# Patient Record
Sex: MALE | Race: WHITE | NOT HISPANIC OR LATINO | Employment: FULL TIME | RURAL
[De-identification: names, ages, dates, MRNs, and addresses within clinical notes are randomized per-mention and may not be internally consistent; named-entity substitution may affect disease eponyms.]

---

## 2024-06-26 ENCOUNTER — PATIENT MESSAGE (OUTPATIENT)
Dept: FAMILY MEDICINE | Facility: CLINIC | Age: 44
End: 2024-06-26
Payer: COMMERCIAL

## 2024-06-26 ENCOUNTER — OFFICE VISIT (OUTPATIENT)
Dept: FAMILY MEDICINE | Facility: CLINIC | Age: 44
End: 2024-06-26
Payer: COMMERCIAL

## 2024-06-26 VITALS
BODY MASS INDEX: 42.95 KG/M2 | RESPIRATION RATE: 20 BRPM | TEMPERATURE: 98 F | DIASTOLIC BLOOD PRESSURE: 78 MMHG | HEART RATE: 72 BPM | SYSTOLIC BLOOD PRESSURE: 129 MMHG | WEIGHT: 290 LBS | HEIGHT: 69 IN | OXYGEN SATURATION: 98 %

## 2024-06-26 DIAGNOSIS — R09.81 NASAL CONGESTION WITH RHINORRHEA: ICD-10-CM

## 2024-06-26 DIAGNOSIS — J32.1 FRONTAL SINUSITIS, UNSPECIFIED CHRONICITY: Primary | ICD-10-CM

## 2024-06-26 DIAGNOSIS — J34.89 NASAL CONGESTION WITH RHINORRHEA: ICD-10-CM

## 2024-06-26 PROCEDURE — 3008F BODY MASS INDEX DOCD: CPT | Mod: ,,,

## 2024-06-26 PROCEDURE — 99213 OFFICE O/P EST LOW 20 MIN: CPT | Mod: 25,,,

## 2024-06-26 PROCEDURE — 1160F RVW MEDS BY RX/DR IN RCRD: CPT | Mod: ,,,

## 2024-06-26 PROCEDURE — 3078F DIAST BP <80 MM HG: CPT | Mod: ,,,

## 2024-06-26 PROCEDURE — 3074F SYST BP LT 130 MM HG: CPT | Mod: ,,,

## 2024-06-26 PROCEDURE — 96372 THER/PROPH/DIAG INJ SC/IM: CPT | Mod: ,,,

## 2024-06-26 PROCEDURE — 1159F MED LIST DOCD IN RCRD: CPT | Mod: ,,,

## 2024-06-26 RX ORDER — AMOXICILLIN AND CLAVULANATE POTASSIUM 875; 125 MG/1; MG/1
1 TABLET, FILM COATED ORAL EVERY 12 HOURS
Qty: 14 TABLET | Refills: 0 | Status: SHIPPED | OUTPATIENT
Start: 2024-06-26 | End: 2024-06-27

## 2024-06-26 RX ORDER — KETOCONAZOLE 20 MG/G
1 CREAM TOPICAL DAILY
COMMUNITY
Start: 2023-07-24

## 2024-06-26 RX ORDER — DEXAMETHASONE SODIUM PHOSPHATE 4 MG/ML
4 INJECTION, SOLUTION INTRA-ARTICULAR; INTRALESIONAL; INTRAMUSCULAR; INTRAVENOUS; SOFT TISSUE
Status: COMPLETED | OUTPATIENT
Start: 2024-06-26 | End: 2024-06-26

## 2024-06-26 RX ORDER — CEFTRIAXONE 1 G/1
1 INJECTION, POWDER, FOR SOLUTION INTRAMUSCULAR; INTRAVENOUS
Status: COMPLETED | OUTPATIENT
Start: 2024-06-26 | End: 2024-06-26

## 2024-06-26 RX ORDER — FLUTICASONE PROPIONATE 50 MCG
1 SPRAY, SUSPENSION (ML) NASAL DAILY
COMMUNITY

## 2024-06-26 RX ORDER — CLOTRIMAZOLE AND BETAMETHASONE DIPROPIONATE 10; .64 MG/G; MG/G
1 CREAM TOPICAL 2 TIMES DAILY
COMMUNITY
Start: 2023-08-08 | End: 2024-08-07

## 2024-06-26 RX ADMIN — CEFTRIAXONE 1 G: 1 INJECTION, POWDER, FOR SOLUTION INTRAMUSCULAR; INTRAVENOUS at 02:06

## 2024-06-26 RX ADMIN — DEXAMETHASONE SODIUM PHOSPHATE 4 MG: 4 INJECTION, SOLUTION INTRA-ARTICULAR; INTRALESIONAL; INTRAMUSCULAR; INTRAVENOUS; SOFT TISSUE at 02:06

## 2024-06-26 NOTE — PATIENT INSTRUCTIONS
Medications sent to pharmacy  Continue flonase  Return to clinic if symptoms worsen and as needed.

## 2024-06-26 NOTE — PROGRESS NOTES
Cee Russell NP   [address] [place] [address]     PATIENT NAME: Paul Chen  : 1980  DATE: 24  MRN: [id number]      Billing Provider: Cee Russell NP  Level of Service:   Patient PCP Information       Provider PCP Type    Primary Doctor No General            Reason for Visit / Chief Complaint: Fatigue, Nasal Congestion, Ear Fullness, and Eye Burn (Allergy, taking flonase every morning.)       Update PCP  Update Chief Complaint         History of Present Illness / Problem Focused Workflow     Paul Chen presents to the clinic with Fatigue, Nasal Congestion, Ear Fullness, and Eye Burn (Allergy, taking flonase every morning.)     Patient here here today for complaints of ear fullness, nasal congestion, fatigue, sinus drainage, sore throat,and burning eyes. Symptoms have been present for the past week. Denies fever, chills, loss of taste or smell. Afebrile today. Medications sent to pharmacy. Return to clinic if symptoms worsen and as needed.     Fatigue  Associated symptoms include congestion, fatigue and a sore throat. Pertinent negatives include no abdominal pain, chest pain, coughing, numbness or rash.   Ear Fullness   Associated symptoms include rhinorrhea and a sore throat. Pertinent negatives include no abdominal pain, coughing, ear discharge or rash.       Review of Systems     Review of Systems   Constitutional:  Positive for fatigue.   HENT:  Positive for nasal congestion, ear pain, postnasal drip, rhinorrhea, sinus pressure/congestion and sore throat. Negative for ear discharge.    Eyes: Negative.  Negative for discharge, redness and itching.   Respiratory: Negative.  Negative for cough, shortness of breath and wheezing.    Cardiovascular: Negative.  Negative for chest pain and palpitations.   Gastrointestinal: Negative.  Negative for abdominal pain.   Genitourinary: Negative.  Negative for frequency and hematuria.   Integumentary:  Negative for  "rash. Negative.   Neurological:  Negative for dizziness and numbness.   Hematological: Negative.    Psychiatric/Behavioral: Negative.          Medical / Social / Family History   History reviewed. No pertinent past medical history.    Past Surgical History:   Procedure Laterality Date    gasllbladder removal      HERNIA REPAIR      left and right achilles tendon repair      ROTATOR CUFF REPAIR Right     ROTATOR CUFF REPAIR         Social History    reports that he has never smoked. He has never been exposed to tobacco smoke. He has never used smokeless tobacco. He reports current alcohol use. He reports that he does not use drugs.    Family History  's family history includes Cancer in his mother; Heart attack in his father; Lymphoma in his father.    Medications and Allergies     Medications  Outpatient Medications Marked as Taking for the 6/26/24 encounter (Office Visit) with Cee Russell NP   Medication Sig Dispense Refill    clotrimazole-betamethasone 1-0.05% (LOTRISONE) cream Apply 1 g topically 2 (two) times daily.      fluticasone propionate (FLONASE) 50 mcg/actuation nasal spray 1 spray by Each Nostril route once daily.      ketoconazole (NIZORAL) 2 % cream Apply 1 g topically once daily.         Allergies  Review of patient's allergies indicates:   Allergen Reactions    Doxycycline     Penicillin v potassium Other (See Comments)       Physical Examination   /78 (BP Location: Left arm, Patient Position: Sitting, BP Method: Medium (Automatic))   Pulse 72   Temp 98.4 °F (36.9 °C) (Oral)   Resp 20   Ht 5' 9" (1.753 m)   Wt 131.5 kg (290 lb)   SpO2 98%   BMI 42.83 kg/m²    Physical Exam  Vitals reviewed.   Constitutional:       Appearance: He is obese. He is ill-appearing.   HENT:      Right Ear: A middle ear effusion is present.      Left Ear: A middle ear effusion is present.      Nose: Congestion and rhinorrhea present. Rhinorrhea is clear.      Right Turbinates: Swollen.      Left " Turbinates: Swollen.      Left Sinus: Frontal sinus tenderness present.      Mouth/Throat:      Mouth: Mucous membranes are moist.      Pharynx: Posterior oropharyngeal erythema present. No pharyngeal swelling or oropharyngeal exudate.   Eyes:      Extraocular Movements: Extraocular movements intact.      Conjunctiva/sclera: Conjunctivae normal.      Pupils: Pupils are equal, round, and reactive to light.   Cardiovascular:      Rate and Rhythm: Normal rate and regular rhythm.      Heart sounds: Normal heart sounds, S1 normal and S2 normal. No murmur heard.  Pulmonary:      Effort: Pulmonary effort is normal. No respiratory distress.      Breath sounds: Normal breath sounds. No wheezing.   Abdominal:      General: Bowel sounds are normal.      Palpations: Abdomen is soft.      Tenderness: There is no abdominal tenderness.   Musculoskeletal:         General: Normal range of motion.      Cervical back: Normal range of motion and neck supple.   Lymphadenopathy:      Cervical: Cervical adenopathy present.   Skin:     General: Skin is warm and dry.      Capillary Refill: Capillary refill takes less than 2 seconds.   Neurological:      General: No focal deficit present.      Mental Status: He is alert and oriented to person, place, and time.   Psychiatric:         Mood and Affect: Mood normal.          Assessment and Plan (including Health Maintenance)      Problem List  Smart Sets  Document Outside HM   :    Plan:   Medications sent to pharmacy  Continue flonase  Return to clinic if symptoms worsen and as needed.       Health Maintenance Due   Topic Date Due    Hepatitis C Screening  Never done    Lipid Panel  Never done    HIV Screening  Never done    TETANUS VACCINE  Never done    Hemoglobin A1c (Diabetic Prevention Screening)  Never done    COVID-19 Vaccine (1 - 2023-24 season) Never done       Problem List Items Addressed This Visit    None      Health Maintenance Topics with due status: Not Due       Topic Last  Completion Date    Influenza Vaccine Not Due       No future appointments.         Signature:  Cee Russell NP      [address] [place] [address]    Date of encounter: 6/26/24

## 2024-06-27 RX ORDER — CEFDINIR 300 MG/1
300 CAPSULE ORAL 2 TIMES DAILY
Qty: 14 CAPSULE | Refills: 0 | Status: SHIPPED | OUTPATIENT
Start: 2024-06-27 | End: 2024-07-04

## 2024-09-05 ENCOUNTER — OFFICE VISIT (OUTPATIENT)
Dept: FAMILY MEDICINE | Facility: CLINIC | Age: 44
End: 2024-09-05
Payer: COMMERCIAL

## 2024-09-05 VITALS
HEART RATE: 82 BPM | TEMPERATURE: 99 F | HEIGHT: 69 IN | BODY MASS INDEX: 43.84 KG/M2 | RESPIRATION RATE: 18 BRPM | OXYGEN SATURATION: 97 % | DIASTOLIC BLOOD PRESSURE: 86 MMHG | SYSTOLIC BLOOD PRESSURE: 131 MMHG | WEIGHT: 296 LBS

## 2024-09-05 DIAGNOSIS — Z13.1 SCREENING FOR DIABETES MELLITUS: ICD-10-CM

## 2024-09-05 DIAGNOSIS — Z12.5 PROSTATE CANCER SCREENING: ICD-10-CM

## 2024-09-05 DIAGNOSIS — Z13.220 SCREENING FOR LIPOID DISORDERS: ICD-10-CM

## 2024-09-05 DIAGNOSIS — M79.672 PAIN OF LEFT HEEL: ICD-10-CM

## 2024-09-05 DIAGNOSIS — Z23 FLU VACCINE NEED: ICD-10-CM

## 2024-09-05 DIAGNOSIS — Z00.00 ROUTINE GENERAL MEDICAL EXAMINATION AT A HEALTH CARE FACILITY: Primary | ICD-10-CM

## 2024-09-05 DIAGNOSIS — Z11.4 SCREENING FOR HIV (HUMAN IMMUNODEFICIENCY VIRUS): ICD-10-CM

## 2024-09-05 DIAGNOSIS — Z11.59 NEED FOR HEPATITIS C SCREENING TEST: ICD-10-CM

## 2024-09-05 DIAGNOSIS — E66.01 SEVERE OBESITY (BMI >= 40): Chronic | ICD-10-CM

## 2024-09-05 PROBLEM — J34.89 NASAL CONGESTION WITH RHINORRHEA: Status: RESOLVED | Noted: 2024-06-26 | Resolved: 2024-09-05

## 2024-09-05 PROBLEM — R09.81 NASAL CONGESTION WITH RHINORRHEA: Status: RESOLVED | Noted: 2024-06-26 | Resolved: 2024-09-05

## 2024-09-05 PROBLEM — J32.1 FRONTAL SINUSITIS: Status: RESOLVED | Noted: 2024-06-26 | Resolved: 2024-09-05

## 2024-09-05 PROCEDURE — 3008F BODY MASS INDEX DOCD: CPT | Mod: ,,, | Performed by: NURSE PRACTITIONER

## 2024-09-05 PROCEDURE — 3075F SYST BP GE 130 - 139MM HG: CPT | Mod: ,,, | Performed by: NURSE PRACTITIONER

## 2024-09-05 PROCEDURE — 90471 IMMUNIZATION ADMIN: CPT | Mod: ,,, | Performed by: NURSE PRACTITIONER

## 2024-09-05 PROCEDURE — 90656 IIV3 VACC NO PRSV 0.5 ML IM: CPT | Mod: ,,, | Performed by: NURSE PRACTITIONER

## 2024-09-05 PROCEDURE — 1159F MED LIST DOCD IN RCRD: CPT | Mod: ,,, | Performed by: NURSE PRACTITIONER

## 2024-09-05 PROCEDURE — 3079F DIAST BP 80-89 MM HG: CPT | Mod: ,,, | Performed by: NURSE PRACTITIONER

## 2024-09-05 PROCEDURE — 1160F RVW MEDS BY RX/DR IN RCRD: CPT | Mod: ,,, | Performed by: NURSE PRACTITIONER

## 2024-09-05 PROCEDURE — 99396 PREV VISIT EST AGE 40-64: CPT | Mod: 25,,, | Performed by: NURSE PRACTITIONER

## 2024-09-05 NOTE — ASSESSMENT & PLAN NOTE
"Height and Weight  Height: 5' 9" (175.3 cm)  Weight: 134.3 kg (296 lb)  BSA (Calculated - sq m): 2.56 sq meters  BMI (Calculated): 43.7  Weight in (lb) to have BMI = 25: 168.9    Patient has experienced failure to lose weight on lifestyle therapy alone.  In accordance with ACE/AACE guidelines, recommend adding medication management with Wegovy or Zepbound in conjunction with a healthy, reduced calorie diet and increased physical activity.    Treatment goal:  To lose 5% of body weight or 15 lbs in the first 16-24 wks of treatment and a long term weight loss goal of 10-15% or more body weight to improve weight related co-morbidities and overall health and well-being.    Discussed medication/treatment options, risks versus benefits of medication(s), and monitoring requirements with patient.  Stressed the importance of decreasing food portion sizes and staying well hydrated with water throughout each day to prevent increase risk for nausea.  Advised will initially need monthly weight management visit for monitoring until reaches a maintenance dose and assured tolerance of med then will increase to every 3 month follow-up.  Verbalized understanding and would like to proceed if med is covered.  "

## 2024-09-05 NOTE — PROGRESS NOTES
[facility]  [address] [place] [address]  Phone: [phone]  Fax: [phone]     PATIENT NAME: Paul Chen   : 1980    AGE: 43 y.o. DATE OF ENCOUNTER: 24    Provider:    MRN: [id number]      Subjective     Patient ID: Paul Chen is a 43 y.o. male.    Chief Complaint: Establish Care (Patient presents to clinic to establish primary care. Patient is not fasting. )    HPI  Establish care, healthy You eligible, not fasting.  No chronic medical conditions.  Would like to get flu shot.    1st thing in am and after sleeping left heel pain for approx 1 mth  Hx torn achilles due to bone spur    Swims 3x/wk, gym 2x/wk for weight training, and intermittent fasting and has only lost 1-2 lbs.  Low carb has worked well in past.  Has difficulty maintaining weight loss.    Review of Systems   Constitutional: Negative.    HENT: Negative.     Eyes: Negative.    Respiratory: Negative.     Cardiovascular: Negative.    Gastrointestinal: Negative.    Endocrine: Negative.    Genitourinary: Negative.    Musculoskeletal: Negative.    Integumentary:  Negative.   Allergic/Immunologic: Negative.    Neurological: Negative.    Hematological: Negative.    Psychiatric/Behavioral: Negative.         Tobacco Use: Low Risk  (2024)    Patient History     Smoking Tobacco Use: Never     Smokeless Tobacco Use: Never     Passive Exposure: Never     Review of patient's allergies indicates:   Allergen Reactions    Cefdinir Anaphylaxis, Rash and Swelling    Doxycycline     Penicillin v potassium Other (See Comments)     Current Outpatient Medications   Medication Instructions    fluticasone propionate (FLONASE) 50 mcg/actuation nasal spray 1 spray, Each Nostril, Daily     Medications Discontinued During This Encounter   Medication Reason    ketoconazole (NIZORAL) 2 % cream Patient no longer taking       History reviewed. No pertinent past medical history.  The patient has no Health  "Maintenance topics of status Not Due  Immunization History   Administered Date(s) Administered    Influenza - Trivalent - PF (ADULT) 09/05/2024       Objective     Body mass index is 43.71 kg/m².  Wt Readings from Last 3 Encounters:   09/05/24 134.3 kg (296 lb)   06/26/24 131.5 kg (290 lb)     Ht Readings from Last 3 Encounters:   09/05/24 5' 9" (1.753 m)   06/26/24 5' 9" (1.753 m)     BP Readings from Last 3 Encounters:   09/05/24 131/86   06/26/24 129/78     Temp Readings from Last 3 Encounters:   09/05/24 98.5 °F (36.9 °C) (Oral)   06/26/24 98.4 °F (36.9 °C) (Oral)     Pulse Readings from Last 3 Encounters:   09/05/24 82   06/26/24 72     Resp Readings from Last 3 Encounters:   09/05/24 18   06/26/24 20     PF Readings from Last 3 Encounters:   No data found for PF       Physical Exam  Vitals and nursing note reviewed.   Constitutional:       General: He is not in acute distress.     Appearance: Normal appearance. He is obese. He is not ill-appearing.   HENT:      Head: Normocephalic.      Right Ear: Tympanic membrane, ear canal and external ear normal.      Left Ear: Tympanic membrane, ear canal and external ear normal.      Nose: Nose normal.      Mouth/Throat:      Mouth: Mucous membranes are moist.      Pharynx: Oropharynx is clear. Uvula midline. No posterior oropharyngeal erythema or uvula swelling.   Eyes:      Conjunctiva/sclera: Conjunctivae normal.      Pupils: Pupils are equal, round, and reactive to light.   Neck:      Thyroid: No thyromegaly.      Vascular: Normal carotid pulses. No carotid bruit.   Cardiovascular:      Rate and Rhythm: Normal rate and regular rhythm.      Pulses: Normal pulses.      Heart sounds: Normal heart sounds.   Pulmonary:      Effort: Pulmonary effort is normal. No respiratory distress.      Breath sounds: Normal breath sounds. No wheezing, rhonchi or rales.   Abdominal:      Palpations: Abdomen is soft.      Tenderness: There is no abdominal tenderness.   Musculoskeletal: " "     Cervical back: Neck supple.      Right lower leg: No edema.      Left lower leg: No edema.      Left foot: Normal range of motion. Bony tenderness (Plantar heel) present. No swelling or deformity. Normal pulse.   Lymphadenopathy:      Cervical: No cervical adenopathy.   Skin:     General: Skin is warm and dry.      Coloration: Skin is not jaundiced or pale.   Neurological:      General: No focal deficit present.      Mental Status: He is alert and oriented to person, place, and time.      Gait: Gait normal.   Psychiatric:         Mood and Affect: Mood normal.         Behavior: Behavior normal.         Assessment and Plan     1. Routine general medical examination at a health care facility    2. Screening for diabetes mellitus  -     Hemoglobin A1C; Future; Expected date: 09/06/2024  -     Glucose, Fasting; Future; Expected date: 09/06/2024    3. Screening for lipoid disorders  -     Lipid Panel; Future; Expected date: 09/06/2024    4. Need for hepatitis C screening test  -     Hepatitis C Antibody; Future; Expected date: 09/06/2024    5. Screening for HIV (human immunodeficiency virus)  -     HIV 1/2 Ag/Ab (4th Gen); Future; Expected date: 09/06/2024    6. Severe obesity (BMI >= 40)  Assessment & Plan:  Height and Weight  Height: 5' 9" (175.3 cm)  Weight: 134.3 kg (296 lb)  BSA (Calculated - sq m): 2.56 sq meters  BMI (Calculated): 43.7  Weight in (lb) to have BMI = 25: 168.9    Patient has experienced failure to lose weight on lifestyle therapy alone.  In accordance with ACE/AACE guidelines, recommend adding medication management with Wegovy or Zepbound in conjunction with a healthy, reduced calorie diet and increased physical activity.    Treatment goal:  To lose 5% of body weight or 15 lbs in the first 16-24 wks of treatment and a long term weight loss goal of 10-15% or more body weight to improve weight related co-morbidities and overall health and well-being.    Discussed medication/treatment options, " risks versus benefits of medication(s), and monitoring requirements with patient.  Stressed the importance of decreasing food portion sizes and staying well hydrated with water throughout each day to prevent increase risk for nausea.  Advised will initially need monthly weight management visit for monitoring until reaches a maintenance dose and assured tolerance of med then will increase to every 3 month follow-up.  Verbalized understanding and would like to proceed if med is covered.    Orders:  -     Hemoglobin A1C; Future; Expected date: 09/06/2024  -     TSH; Future; Expected date: 09/05/2024    7. Prostate cancer screening  -     PSA, Screening; Future; Expected date: 09/06/2024    8. Pain of left heel  Assessment & Plan:  Ice, stretches, exercises      9. Flu vaccine need  -     influenza (Flulaval, Fluzone, Fluarix) 45 mcg/0.5 mL vaccine 0.5 mL         Plan:   HY screening labs and labs to address care gaps, TSH due to weight struggles - will return to clinic fasting in a.m..  Recommend pharmacological weight management in conjunction with therapeutic lifestyle changes if covered by insurance.  If so will schedule one-mth f/u.        I have reviewed the medications, allergies, and problem list.     Goal Actions:    What type of visit is the patient here for today?: Healthy You  Does the patient consent to enroll in [employer] Me Healthy?:  (n/a)  Is this a Wellness Follow Up?: No  What is your overall wellness goal? (select at least one): Lifestyle modifications, Lose weight  Choose 3: Biometric, Nutrition, Exercise  Biometric Actions: BMI>30 lose 1-2 lbs per week, SBP<120 and DBP<80  Nurtrition Actions: Recommend weight loss, Eat heart healthy diet, drink 8-10 glasses of water per day  Exercise Actions: Recommend physical activity 30 minutes per day 3-5 times/week      Follow up for Healthy You 1 yr with fasting labs; 1-mth weight management if med covered.    Signature:  MCKENNA Ramírez-BC

## 2024-09-09 DIAGNOSIS — E66.01 SEVERE OBESITY (BMI >= 40): Primary | Chronic | ICD-10-CM

## 2024-09-09 RX ORDER — SEMAGLUTIDE 0.25 MG/.5ML
0.25 INJECTION, SOLUTION SUBCUTANEOUS
Qty: 2 ML | Refills: 0 | Status: SHIPPED | OUTPATIENT
Start: 2024-09-09 | End: 2024-10-07

## 2024-11-19 ENCOUNTER — PATIENT MESSAGE (OUTPATIENT)
Dept: FAMILY MEDICINE | Facility: CLINIC | Age: 44
End: 2024-11-19
Payer: COMMERCIAL

## 2024-11-19 DIAGNOSIS — M72.2 PLANTAR FASCIITIS, LEFT: Primary | ICD-10-CM

## 2024-11-19 DIAGNOSIS — M79.672 PAIN OF LEFT HEEL: ICD-10-CM
